# Patient Record
Sex: FEMALE | Race: WHITE | NOT HISPANIC OR LATINO | URBAN - METROPOLITAN AREA
[De-identification: names, ages, dates, MRNs, and addresses within clinical notes are randomized per-mention and may not be internally consistent; named-entity substitution may affect disease eponyms.]

---

## 2023-06-06 ENCOUNTER — TELEPHONE (OUTPATIENT)
Dept: TRANSPLANT | Facility: CLINIC | Age: 43
End: 2023-06-06

## 2023-06-06 NOTE — TELEPHONE ENCOUNTER
"Donor Intake Start:23Donor Intake Complete:23  Expiration Date:23  Gender:FemalePreferred Language:English  Full Name:Akosua Palm  Needed:[not answered]  Phone Number:[phone] Phone:  Contact Preference:[not answered]Best Contact Time:9am - 5pm  Emergency Contact:Mahin Barrera Contact #:[phone]  Relationship to Contact:Contact is my spouse  :[birth year]Age:43  Country:[place]  Address[address] [place]  State:[place]Postal Code:[id number]  Height:5'5\"Weight:190lbs  BMI:31.6  Employment Status:EmployedHas PTO for donation?Yes, using vacation  Occupation:teacherRequires Heavy Lifting?  No  Education Level:Advanced DegreeMarital Status:  Exercise Routine:2-3/WeekHealth Insurance:  Yes  Blood Type:OEthnicity/Race:White  Donor Type:Standard Voucher Donor  Prefer Remote Donation:[not answered]  Physician:Rea العلي [place], [place]  Motivation to donate:  save a life  Living Donor Pre-Screening  [place] In [place]  Yes  Will Accept Blood Transfusions?  Yes  Has been Diagnosed with Kidney Disease?  No  Has had a Heart Attack?  No  Has Diabetes?  No  Has had Cancer?  Yes  Type Remission  Other 4 years  Has had Kidney Stones?  No  Has ever been Pregnant?  Yes    - Is Currently Pregnant?  No    - Months Since Pregnancy?24+    - Is Currently Nursing?  No    - Gestational Diabetes?  No    - Hypertension during pregnancy?  Treated in past  Is Planning on Pregnancy?  No  Is Taking Birth Control?  No  Has Used Tobacco  Yes    - Currently uses Tobacco?  Yes    - Will Stop for Surgery?  Yes    - How Many Years:4    - Tobacco use (packs/cans) / Frequency:3 / Yearly  Has HIV?  No  Is Currently Incarcerated?  No  Is Currently Residing in [place]  Yes  History Misc  Has Allergies?  Yes  Allergy  penicilin, sulfa drugs  Has had Surgeries?  Yes  Surgery When  3 d and c over 3 years ago  Takes Medication?  No  Medical History  History of High BP?  Never  Has History " Of CABG (bypass surgery)?  No  History of Blood Clots?  Never  History of Coronary Disease?  Never  Has Stents Implanted?  No  Has History of Chest Pain with Exercise?  No  Has History of Chest Pain at Other Times?  No  Results of Climbing 2 Flights of Stairs?No Problem  Has had Stress Test within Last Year?  No  Has had Stroke?  No  Has had Leg Bypass?  No  History of Lung Disease?  Never  History of COPD?  Never  History of TB?  Never    - Is TB Active?[not answered]  History of Pneumonia?  Never  Has Respiratory Issues?  No  Has Gastro Issues?  No  History of Gallstones?  Never  History of Pancreatitis?  Never  History of Liver Disease?  Never  History of Hepatitis B?  Never    - Is Hep B Active?[not answered]  History of Hepatitis C?  Never  History of Bleeding Problem?  Never  History of UTIs?  Yes    - UTI episodes:3    - Last UTI:10 years  History of Kidney Damage?  Never  History of Proteinuria?  Never  History of Hematuria?  Never  History of Neuro Disease?  Never  History of Seizure?  Never  History of Lupus?  Never  History of Paralysis?  Never  History of Arthritis?  Never  History of Neuropathy?  Never  History of Depression?  Treated in past  History of Anxiety?  Treated in past  History of Documented Psychiatric Illness?  Never  History of Fibroid Uterus?  Treated in past  History of Endometriosis?  Never  History of Polycystic Ovaries?  Never  Has had Miscarriages?  Yes    - # of Miscarriages:4    - Had Late Term Miscarriage?  No  Has had Abortions?  No  Has had Transfusions?  No  History of Obesity?  No  History of Fabry's Disease?  No  History of Sickle Cell Disease?  No  History of Sickle Cell Trait?  No  History of Sarcoidosis?  No  History of Auto-Immune Disease  No  Has had Physical Exam?  No  Has had Mammogram?  No    - how many years ago:  Has had Pap Smear?  Yes    - how many years ago:2  Has had Colonoscopy?  No  Medical History Comments?[no comments]  Living Donor Family Medical  History  Anyone with kidney disease?  No  Anyone with liver disease?  No  Anyone with heart disease?  No  Anyone with coronary artery disease?  No  Anyone with high blood pressure?  No  Anyone with blood disorder?  No  Anyone with cancer?  Yes    - which family members:dad, grandparents  Anyone with kidney cancer?  No  Anyone with diabetes?  Yes    - which family members:grandpa  Is mother alive?  Yes  Mother's age?76  Is father alive?  Yes  Father's age?71  How many siblings?2  How many adult children?0  How many children under 18?2  Social History  Has Used Alcohol?  Yes    - currently uses alcohol:  Yes    - how much:4/Weekly  Has Abused Alcohol?  No  Has Used Drugs?  No  Has had legal issues w/ law enforcement?  Yes  Has been sentenced to half-way?  No  Traveled over 100 miles from home in last year?  Yes    - [place]  Has had suicidal thoughts or attempts in the last five years?  No

## 2023-06-07 ENCOUNTER — DOCUMENTATION ONLY (OUTPATIENT)
Dept: TRANSPLANT | Facility: CLINIC | Age: 43
End: 2023-06-07

## 2023-06-12 ENCOUNTER — TELEPHONE (OUTPATIENT)
Dept: TRANSPLANT | Facility: CLINIC | Age: 43
End: 2023-06-12

## 2023-06-12 NOTE — TELEPHONE ENCOUNTER
Initial Independent Living Donor Advocate contact made with potential donor today.  I introduced myself and my role during the donation process, includin.  ANUM ROLE   The federal government requires that all licensed transplant centers provide the living donor with an Independent Living Donor Advocate (ANUM).  I do not meet recipients or attend meetings that discuss their care or decision to transplant them. My role is separate to avoid any conflict of interest.  My role is to ensure:  1) your rights are protected;  2) you get all the information you need from the transplant team to make a fully informed decision whether to donate;   3) that living donation is in your best interest.   4) that you have the right to decide NOT to go forward with living donation at any time during this process.  I am available to you throughout the workup, during surgery phase and follow-up at home.     2. WORKUP & PRIVACY     Your identity and workup are not shared with the recipient at any time.     The recipient's insurance covers the medical expenses related to the donor evaluation and surgery.  However, it is important that you carry your own health insurance to address any medical issues that are found and are NOT related to living donation.  Additionally, age appropriate cancer screening (I.e. mammograms,  colonoscopies, etc) is required and would be through your insurance.    There is a psychosocial and medical donor workup that consists of testing to determine if you are healthy enough to donate. Workup tests include tissue typing/genetics, many blood draws, urine collection/ (kidney function testing), chest x-ray, EKG/other heart testing, CT scan. Age appropriate cancer screening is required and would be through your insurance. As you complete each step then you may move on to the next.  Workup can take as little or as long as you need and you can stop the process at any time. Transplant is a treatment option, not a  cure. A kidney from a living kidney donor can last 12-14 years.  Other treatment options are  donation and two types of dialysis.     This is major surgery and your estimated hospital stay is approximately 1-2 nights.  After surgery, there are driving and lifting restrictions - no driving for two weeks and no lifting over ten pounds for 8 weeks.  Donors are routinely off from work for 4 - 6 weeks after surgery, and potentially longer if they have a physical job.       If you anticipate lost wages due to donation, donor wage reimbursement options may be available to you and will be reviewed with you during the evaluation process. Donor Shield and NLDAC explained.    We reviewed the importance of completing follow-up labs and surveys at six months, 1 year and 2 years after donation to monitor kidney health and the impact donation has had on their life post donation.       3.  QUESTIONS    Have you received the Welcome e-mail that includes copies of the informed consent, financial letter, information on donor shield and NLDAC from the transplant department? Yes.    Have you discussed with anyone your potential decision to donate?   Yes.    Is anyone pressuring or coercing you to donate? No.    Have you discussed any financial arrangements with recipient around donating a kidney? No.    Are you aware that you can confidentially opt out at any time, up to and including day of donation? Yes.    At this time, would you like to proceed with the medical evaluation to see if you can be a kidney donor?  Yes.    If yes, I will make an appointment for your donor coordinator to reach out to you with next steps.     Contact information for ANUM's was provided Yes.    Becky Kasper- [phone]  Cathleen Cerna- [phone]      Time frame for donation: open  Paired exchange was introduced  Yes.      MyChart was initiated  Yes.  CareEverywhere was initiated No.    ANUM NOTES: Akosua wants to give to a friend.  Her donor nurse  is Tiffanie Gutierrez, [facility], I gave Akosua her contact information and said that Tiffanie would call within a couple of business days.    Duration of call 30 minutes

## 2023-06-16 ENCOUNTER — TELEPHONE (OUTPATIENT)
Dept: TRANSPLANT | Facility: CLINIC | Age: 43
End: 2023-06-16

## 2023-07-06 ENCOUNTER — DOCUMENTATION ONLY (OUTPATIENT)
Dept: TRANSPLANT | Facility: CLINIC | Age: 43
End: 2023-07-06

## 2023-07-06 ENCOUNTER — TELEPHONE (OUTPATIENT)
Dept: TRANSPLANT | Facility: CLINIC | Age: 43
End: 2023-07-06

## 2023-07-06 NOTE — PROGRESS NOTES
Georgina,  Thank you for talking with me today.  Just wanted to follow up on my call.  You will be receiving only one kit, not two.  Please follow the directions on the National Kidney Registry kit and send back.  I also sent you a consent via [employer].  This consent and information is because we have started testing.  Please let us know if you have questions.  Thank you for coming forward.  Cherelle

## 2023-07-21 ENCOUNTER — TELEPHONE (OUTPATIENT)
Dept: TRANSPLANT | Facility: CLINIC | Age: 43
End: 2023-07-21

## 2023-07-21 ENCOUNTER — DOCUMENTATION ONLY (OUTPATIENT)
Dept: TRANSPLANT | Facility: CLINIC | Age: 43
End: 2023-07-21

## 2023-07-21 NOTE — PROGRESS NOTES
VXM performed using [employer] NKR 3G Tissue Typing (no internal typing available).    Mm: 2222, DSA: [id number] (4-7-23)     Immunology does NOT recommend direct donation d/t high MFI DSA.      Tiffanie Gutierrez RN, BSN, CCTN  Living Donor Coordinator  [phone]

## 2023-07-25 ENCOUNTER — TELEPHONE (OUTPATIENT)
Dept: TRANSPLANT | Facility: CLINIC | Age: 43
End: 2023-07-25

## 2023-07-25 NOTE — TELEPHONE ENCOUNTER
Attempted to call Akosua to give her XM results. Got voice mail. Per her last message left to Donor Team giving permission to give her the results on message.Now relayed message that it is recommended she not be a direcxt donor. To call back if questions.